# Patient Record
Sex: FEMALE | Race: NATIVE HAWAIIAN OR OTHER PACIFIC ISLANDER | HISPANIC OR LATINO | URBAN - METROPOLITAN AREA
[De-identification: names, ages, dates, MRNs, and addresses within clinical notes are randomized per-mention and may not be internally consistent; named-entity substitution may affect disease eponyms.]

---

## 2023-05-29 ENCOUNTER — EMERGENCY (EMERGENCY)
Facility: HOSPITAL | Age: 83
LOS: 1 days | Discharge: HOME | End: 2023-05-29
Attending: EMERGENCY MEDICINE
Payer: MEDICAID

## 2023-05-29 VITALS
HEART RATE: 73 BPM | OXYGEN SATURATION: 94 % | TEMPERATURE: 98 F | SYSTOLIC BLOOD PRESSURE: 129 MMHG | DIASTOLIC BLOOD PRESSURE: 66 MMHG | RESPIRATION RATE: 16 BRPM

## 2023-05-29 VITALS
OXYGEN SATURATION: 96 % | SYSTOLIC BLOOD PRESSURE: 118 MMHG | DIASTOLIC BLOOD PRESSURE: 66 MMHG | RESPIRATION RATE: 18 BRPM | WEIGHT: 139.99 LBS | HEART RATE: 71 BPM | TEMPERATURE: 99 F

## 2023-05-29 PROCEDURE — 99284 EMERGENCY DEPT VISIT MOD MDM: CPT

## 2023-05-29 RX ORDER — AMLODIPINE BESYLATE 2.5 MG/1
1 TABLET ORAL
Qty: 10 | Refills: 0
Start: 2023-05-29 | End: 2023-06-07

## 2023-05-29 RX ORDER — FUROSEMIDE 40 MG
1 TABLET ORAL
Qty: 20 | Refills: 0
Start: 2023-05-29 | End: 2023-06-07

## 2023-05-29 RX ORDER — FAMOTIDINE 10 MG/ML
1 INJECTION INTRAVENOUS
Qty: 20 | Refills: 0
Start: 2023-05-29 | End: 2023-06-07

## 2023-05-29 RX ORDER — CARVEDILOL PHOSPHATE 80 MG/1
1 CAPSULE, EXTENDED RELEASE ORAL
Qty: 10 | Refills: 0
Start: 2023-05-29 | End: 2023-06-07

## 2023-05-29 RX ORDER — ATORVASTATIN CALCIUM 80 MG/1
1 TABLET, FILM COATED ORAL
Qty: 10 | Refills: 0
Start: 2023-05-29 | End: 2023-06-07

## 2023-05-29 NOTE — ED PROVIDER NOTE - NSFOLLOWUPCLINICS_GEN_ALL_ED_FT
[facility] - Urology  Urology  [address], 3rd & 4th [place] [address]  Phone: [phone]  Fax:

## 2023-05-29 NOTE — ED PROVIDER NOTE - CLINICAL SUMMARY MEDICAL DECISION MAKING FREE TEXT BOX
Attending note.  Patient with reported renal stone which was recommended to have surgery when patient was in [facility] last month, however patient has not had pain or symptoms of stones in the last 2 weeks.  Referral to urology.  Patient also ran out of her medications including amlodipine, carvedilol, furosemide, atorvastatin yesterday.  Prescription for same.  Referral to internal medicine as primary care.  Patient also complains of some epigastric pain intermittently when eating likely gastritis.  Recommend Pepcid AC.  Instructions given to daughter regarding if patient has recurrent pain, fevers, vomiting, hematuria, she should return to the emergency department for evaluation.

## 2023-05-29 NOTE — ED PROVIDER NOTE - NS ED ATTENDING STATEMENT MOD
This was a shared visit with the H[facility]. I reviewed and verified the documentation and independently performed the documented:

## 2023-05-29 NOTE — ED PROVIDER NOTE - OBJECTIVE STATEMENT
83-year-old female accompanied by her daughter who translates at her request presents the emergency department for medication refills.  Patient is from [facility] states that she has high blood pressure high cholesterol and per paperwork also CKD and history of pancreatitis.  Patient was hospitalized last month in [facility] and felt she required surgery for kidney stone she opted not to get the surgery.  Patient states that she has no symptoms at this time. denies fever/chills/abd pain/n/v/urinary complaints/hematuria. patient with no physicians here in [place]. 83-year-old female accompanied by her daughter who translates at her request presents the emergency department for medication refills.  Patient is from [facility] states that she has high blood pressure high cholesterol and per paperwork also CKD and history of pancreatitis.  Patient was hospitalized last month in [facility] and felt she required surgery for kidney stone she opted not to get the surgery.  Patient states that she has no symptoms at this time. denies fever/chills/abd pain/n/v/urinary complaints/hematuria. patient with no physicians here in [place].       Attending note.  Patient was seen in room #21 to the left.  Agree with above.  Patient was admitted to the hospital in [facility] with renal stone.  Is not known which side or how large the stone was or where it was.  Daughter states they recommended surgery but patient was too weak.  Patient came to [place] 2 weeks ago.  She has not had pain during the last 2 weeks.  She denies any fevers, chills, sweats, rigors.  She denies any hematuria or dysuria.  She denies any back pain.  She does report intermittent epigastric pain with nausea when she eats.  She has prior abdominal surgery for ovarian cysts.  Patient also ran out of her atorvastatin 10 mg, carvedilol 6.25 mg twice daily, amlodipine 5 mg daily and furosemide 40 mg twice daily.  She ran out of these medications yesterday.

## 2023-05-29 NOTE — ED PROVIDER NOTE - CHILD ABUSE FACILITY
[Dear  ___] : Dear  [unfilled], [Consult Letter:] : I had the pleasure of evaluating your patient, [unfilled]. [Please see my note below.] : Please see my note below. [Consult Closing:] : Thank you very much for allowing me to participate in the care of this patient.  If you have any questions, please do not hesitate to contact me. [Sincerely,] : Sincerely, [FreeTextEntry3] : Lake Nuñez, DO\par Genitourinary Medicine\par  [facility]

## 2023-05-29 NOTE — ED ADULT NURSE NOTE - OBJECTIVE STATEMENT
82 y/o F PMH HTN, DM, Pancreatitis presented to ED c/o abd pain. Pt daughter at bedside to provide hx and translation. Pt daughter states pt was hospitalized recently in [facility] who told her she needed "surgery for kidney stones" pt's family did not want pt having surgery in [facility] so came to [place], brought to [facility] ED for follow up. Pt c/o mild abd pain, slight tenderness in LLQ and RLQ on palpation, pt denies dysuria, hematuria, N/V/D, fevers, cp, sob, headaches, dizziness, cough, known sick contacts at this time. Pt A&Ox3, breathing spontaneously and unlabored, speaking full sentences, moving all extremities, is ambulatory with assistance. Appropriate safety measures in place, call bell within reach, family at bedside for translation and history.

## 2023-05-29 NOTE — ED PROVIDER NOTE - NSICDXPASTMEDICALHX_GEN_ALL_CORE_FT
PAST MEDICAL HISTORY:  Chronic kidney disease (CKD)     HLD (hyperlipidemia)     HTN (hypertension)

## 2023-05-29 NOTE — ED ADULT NURSE NOTE - NS ED NOTE ABUSE RESPONSE YN
Problem: Impaired Functional Mobility  Goal: Achieve highest/safest level of mobility/gait  Description  Interventions:  - Assess patient's functional ability and stability  - Promote increasing activity/tolerance for mobility and gait  - Educate and eng Yes

## 2023-05-29 NOTE — ED PROVIDER NOTE - PATIENT PORTAL LINK FT
You can access the FollowMyHealth Patient Portal offered by [facility] by registering at the following website: [email or web address]. By joining [employer]’s FollowMyHealth portal, you will also be able to view your health information using other applications (apps) compatible with our system.

## 2023-05-29 NOTE — ED ADULT NURSE NOTE - CHIEF COMPLAINT QUOTE
pt recently came from [facility] 2 weeks ago was suppose to have sx for kidney stone and needs medications refilled. pt c/o epigastric pain after eating with slight Nausea and intermittent SOB w/ cough. pain is epigastric. pt's family endorses cardiac hx but is unsure.

## 2023-05-29 NOTE — ED ADULT NURSE NOTE - NSFALLRISKINTERV_ED_ALL_ED
Assistance OOB with selected safe patient handling equipment if applicable/Assistance with ambulation/Communicate fall risk and risk factors to all staff, patient, and family/Monitor gait and stability/Provide visual cue: yellow wristband, yellow gown, etc/Reinforce activity limits and safety measures with patient and family/Call bell, personal items and telephone in reach/Instruct patient to call for assistance before getting out of bed/chair/stretcher/Non-slip footwear applied when patient is off stretcher/[place] to call system/Physically safe environment - no spills, clutter or unnecessary equipment/Purposeful Proactive Rounding/Room/bathroom lighting operational, light cord in reach

## 2023-05-29 NOTE — ED PROVIDER NOTE - NSFOLLOWUPINSTRUCTIONS_ED_ALL_ED_FT
Follow up with primary care to establish care and ensure that you have your chronic medications  Follow up with urology regarding your known kidney stones  Bring a copy of your test results with you to your appointment  Continue your current medication regimen  Return to the Emergency Room if you experience new or worsening symptoms - fever, chills, nausea, vomiting, severe back pain or new concerning symptoms.

## 2023-05-29 NOTE — ED ADULT NURSE NOTE - CAS ELECT INFOMATION PROVIDED
Pt and family at bedside instructed to follow up with outpatient urology, return to ED with worsening s/s. Family member translating in Bengali at bedside to patient, both verbalizes understanding./DC instructions

## 2023-06-02 PROBLEM — I10 ESSENTIAL (PRIMARY) HYPERTENSION: Chronic | Status: ACTIVE | Noted: 2023-05-29

## 2023-06-02 PROBLEM — E78.5 HYPERLIPIDEMIA, UNSPECIFIED: Chronic | Status: ACTIVE | Noted: 2023-05-29

## 2023-06-02 PROBLEM — Z00.00 ENCOUNTER FOR PREVENTIVE HEALTH EXAMINATION: Status: ACTIVE | Noted: 2023-06-02

## 2023-06-02 PROBLEM — N18.9 CHRONIC KIDNEY DISEASE, UNSPECIFIED: Chronic | Status: ACTIVE | Noted: 2023-05-29

## 2023-06-19 ENCOUNTER — OUTPATIENT (OUTPATIENT)
Dept: OUTPATIENT SERVICES | Facility: HOSPITAL | Age: 83
LOS: 1 days | End: 2023-06-19
Payer: SELF-PAY

## 2023-06-19 ENCOUNTER — APPOINTMENT (OUTPATIENT)
Age: 83
End: 2023-06-19

## 2023-06-19 VITALS
HEART RATE: 80 BPM | TEMPERATURE: 98 F | HEIGHT: 59 IN | DIASTOLIC BLOOD PRESSURE: 80 MMHG | BODY MASS INDEX: 26.21 KG/M2 | WEIGHT: 130 LBS | RESPIRATION RATE: 16 BRPM | SYSTOLIC BLOOD PRESSURE: 169 MMHG

## 2023-06-19 DIAGNOSIS — N20.0 CALCULUS OF KIDNEY: ICD-10-CM

## 2023-06-19 DIAGNOSIS — R30.0 DYSURIA: ICD-10-CM

## 2023-06-19 LAB
ANION GAP SERPL CALC-SCNC: 15 MMOL/L — SIGNIFICANT CHANGE UP (ref 5–17)
APPEARANCE UR: CLEAR — SIGNIFICANT CHANGE UP
BACTERIA # UR AUTO: NEGATIVE /HPF — SIGNIFICANT CHANGE UP
BILIRUB UR-MCNC: NEGATIVE — SIGNIFICANT CHANGE UP
BUN SERPL-MCNC: 32 MG/DL — HIGH (ref 7–23)
CALCIUM SERPL-MCNC: 9.8 MG/DL — SIGNIFICANT CHANGE UP (ref 8.4–10.5)
CAST: 0 /LPF — SIGNIFICANT CHANGE UP (ref 0–4)
CHLORIDE SERPL-SCNC: 95 MMOL/L — LOW (ref 96–108)
CO2 SERPL-SCNC: 30 MMOL/L — SIGNIFICANT CHANGE UP (ref 22–31)
COLOR SPEC: YELLOW — SIGNIFICANT CHANGE UP
CREAT SERPL-MCNC: 2.08 MG/DL — HIGH (ref 0.5–1.3)
DIFF PNL FLD: NEGATIVE — SIGNIFICANT CHANGE UP
EGFR: 24 ML/MIN/1.73M2 — LOW
GLUCOSE SERPL-MCNC: 143 MG/DL — HIGH (ref 70–99)
GLUCOSE UR QL: NEGATIVE MG/DL — SIGNIFICANT CHANGE UP
KETONES UR-MCNC: NEGATIVE MG/DL — SIGNIFICANT CHANGE UP
LEUKOCYTE ESTERASE UR-ACNC: ABNORMAL
NITRITE UR-MCNC: NEGATIVE — SIGNIFICANT CHANGE UP
PH UR: 7.5 — SIGNIFICANT CHANGE UP (ref 5–8)
POTASSIUM SERPL-MCNC: 4 MMOL/L — SIGNIFICANT CHANGE UP (ref 3.5–5.3)
POTASSIUM SERPL-SCNC: 4 MMOL/L — SIGNIFICANT CHANGE UP (ref 3.5–5.3)
PROT UR-MCNC: 30 MG/DL
RBC CASTS # UR COMP ASSIST: 0 /HPF — SIGNIFICANT CHANGE UP (ref 0–4)
SODIUM SERPL-SCNC: 139 MMOL/L — SIGNIFICANT CHANGE UP (ref 135–145)
SP GR SPEC: 1.01 — SIGNIFICANT CHANGE UP (ref 1–1.03)
SQUAMOUS # UR AUTO: 1 /HPF — SIGNIFICANT CHANGE UP (ref 0–5)
UROBILINOGEN FLD QL: 0.2 MG/DL — SIGNIFICANT CHANGE UP (ref 0.2–1)
WBC UR QL: 0 /HPF — SIGNIFICANT CHANGE UP (ref 0–5)

## 2023-06-19 PROCEDURE — 81001 URINALYSIS AUTO W/SCOPE: CPT

## 2023-06-19 PROCEDURE — 87086 URINE CULTURE/COLONY COUNT: CPT

## 2023-06-19 PROCEDURE — 80048 BASIC METABOLIC PNL TOTAL CA: CPT

## 2023-06-19 PROCEDURE — G0463: CPT

## 2023-06-19 PROCEDURE — 36415 COLL VENOUS BLD VENIPUNCTURE: CPT

## 2023-06-19 RX ORDER — CARVEDILOL 6.25 MG/1
6.25 TABLET, FILM COATED ORAL DAILY
Refills: 0 | Status: ACTIVE | COMMUNITY
Start: 2023-06-19

## 2023-06-19 RX ORDER — FUROSEMIDE 40 MG/1
40 TABLET ORAL TWICE DAILY
Refills: 0 | Status: ACTIVE | COMMUNITY
Start: 2023-06-19

## 2023-06-19 RX ORDER — FAMOTIDINE 20 MG/1
20 TABLET, FILM COATED ORAL TWICE DAILY
Refills: 0 | Status: ACTIVE | COMMUNITY
Start: 2023-06-19

## 2023-06-19 RX ORDER — ATORVASTATIN CALCIUM 10 MG/1
10 TABLET, FILM COATED ORAL
Refills: 0 | Status: ACTIVE | COMMUNITY
Start: 2023-06-19

## 2023-06-19 RX ORDER — AMLODIPINE BESYLATE 5 MG/1
5 TABLET ORAL DAILY
Refills: 0 | Status: ACTIVE | COMMUNITY
Start: 2023-06-19

## 2023-06-19 NOTE — PHYSICAL EXAM

## 2023-06-19 NOTE — ASSESSMENT
[FreeTextEntry1] : 83 year old female who recently came to [place] from [facility] after a hospitalization for pancreatitis where she was found to have a kidney stone. Daughter and patient do not have any information or imaging about location or size of kidney stone. No flank pain, UTIs, dysuria, or hematuria. Patient and daughter would like to know more about stone and see if surgical management is necessary. No labs in our system since at ED visit all that was done was medication refill.\par - Urinalysis and Urine Culture today\par - CBC and BMP today\par - CT renal stone hunt ordered \par - Patient will get scan done at [facility]\par - Return to clinic in 3 weeks after scan to discuss findings and next steps\par - Will call with test results from clinic today

## 2023-06-19 NOTE — HISTORY OF PRESENT ILLNESS
[FreeTextEntry1] : 83-year-old female accompanied by her daughter who translates at her request presents to the office for evaluation of a kidney stone they first found out about when she was hospitalized in [facility] 3 months ago for pancreatitis. She initially presented to the ED on 5/29/23 the for medication refills.  Patient is from [facility] states that she has high blood pressure high cholesterol and per paperwork also CKD and history of pancreatitis. Patient was hospitalized last month in [facility] and felt she required surgery for kidney stone she opted not to get the surgery.  Patient states that she has no symptoms at this time. denies fever/chills/abd pain/n/v/urinary complaints/hematuria. patient with no physicians here in [place]. Daughter translated bedside and states that she has no flank pain, has never had dysuria or hematuria. She feels like she completely empties her bladder. Has never had nephrolithiasis in the past and has not had any history of urinary tract infections. Denies any fevers, chills, or abdominal pain. No smoking or drinking history. No family history of bladder cancer, kidney cancer, or prostate cancer. Currently taking 3 anti-hypertensives and a statin for HLD.

## 2023-06-22 ENCOUNTER — APPOINTMENT (OUTPATIENT)
Dept: CT IMAGING | Facility: CLINIC | Age: 83
End: 2023-06-22
Payer: MEDICAID

## 2023-06-22 ENCOUNTER — OUTPATIENT (OUTPATIENT)
Dept: OUTPATIENT SERVICES | Facility: HOSPITAL | Age: 83
LOS: 1 days | End: 2023-06-22
Payer: SELF-PAY

## 2023-06-22 DIAGNOSIS — N20.0 CALCULUS OF KIDNEY: ICD-10-CM

## 2023-06-22 PROCEDURE — 74176 CT ABD & PELVIS W/O CONTRAST: CPT

## 2023-06-22 PROCEDURE — 74176 CT ABD & PELVIS W/O CONTRAST: CPT | Mod: 26

## 2023-07-10 ENCOUNTER — APPOINTMENT (OUTPATIENT)
Dept: UROLOGY | Facility: HOSPITAL | Age: 83
End: 2023-07-10

## 2025-05-15 NOTE — ED ADULT NURSE NOTE - SUICIDE SCREENING QUESTION 1
FOLLOW UP CARDIOLOGY ONCE DISCHARGED FROM REHAB  RETURN FOR WORSENING SOB OR CHEST PAIN  CONTINUE ALL HOME MEDS  
No